# Patient Record
Sex: FEMALE | URBAN - METROPOLITAN AREA
[De-identification: names, ages, dates, MRNs, and addresses within clinical notes are randomized per-mention and may not be internally consistent; named-entity substitution may affect disease eponyms.]

---

## 2017-10-16 ENCOUNTER — EMERGENCY (EMERGENCY)
Facility: HOSPITAL | Age: 22
LOS: 1 days | End: 2017-10-16
Payer: COMMERCIAL

## 2017-10-16 PROCEDURE — 99284 EMERGENCY DEPT VISIT MOD MDM: CPT

## 2017-10-16 PROCEDURE — 73590 X-RAY EXAM OF LOWER LEG: CPT | Mod: 26,LT

## 2017-10-16 PROCEDURE — 73030 X-RAY EXAM OF SHOULDER: CPT | Mod: 26,RT

## 2023-09-07 ENCOUNTER — APPOINTMENT (OUTPATIENT)
Dept: CARDIOLOGY | Facility: CLINIC | Age: 28
End: 2023-09-07
Payer: COMMERCIAL

## 2023-09-07 ENCOUNTER — NON-APPOINTMENT (OUTPATIENT)
Age: 28
End: 2023-09-07

## 2023-09-07 VITALS
WEIGHT: 158 LBS | SYSTOLIC BLOOD PRESSURE: 130 MMHG | HEIGHT: 64 IN | HEART RATE: 77 BPM | OXYGEN SATURATION: 95 % | DIASTOLIC BLOOD PRESSURE: 80 MMHG | BODY MASS INDEX: 26.98 KG/M2

## 2023-09-07 DIAGNOSIS — Z78.9 OTHER SPECIFIED HEALTH STATUS: ICD-10-CM

## 2023-09-07 DIAGNOSIS — R94.31 ABNORMAL ELECTROCARDIOGRAM [ECG] [EKG]: ICD-10-CM

## 2023-09-07 DIAGNOSIS — R00.2 PALPITATIONS: ICD-10-CM

## 2023-09-07 DIAGNOSIS — Z82.49 FAMILY HISTORY OF ISCHEMIC HEART DISEASE AND OTHER DISEASES OF THE CIRCULATORY SYSTEM: ICD-10-CM

## 2023-09-07 DIAGNOSIS — R00.0 TACHYCARDIA, UNSPECIFIED: ICD-10-CM

## 2023-09-07 DIAGNOSIS — Z00.00 ENCOUNTER FOR GENERAL ADULT MEDICAL EXAMINATION W/OUT ABNORMAL FINDINGS: ICD-10-CM

## 2023-09-07 PROCEDURE — 99204 OFFICE O/P NEW MOD 45 MIN: CPT | Mod: 25

## 2023-09-07 PROCEDURE — 93000 ELECTROCARDIOGRAM COMPLETE: CPT

## 2023-09-07 PROCEDURE — 93306 TTE W/DOPPLER COMPLETE: CPT

## 2023-09-07 PROCEDURE — 76376 3D RENDER W/INTRP POSTPROCES: CPT

## 2023-09-07 RX ORDER — NORGESTIMATE AND ETHINYL ESTRADIOL 0.25-0.035
0.25-35 KIT ORAL
Refills: 0 | Status: ACTIVE | COMMUNITY

## 2023-09-07 NOTE — HISTORY OF PRESENT ILLNESS
[FreeTextEntry1] : She has no chest pain She has no shortness of breath She has  palpitations She has no syncope She is neurologically intact She has no edema She has no skin rashes

## 2023-09-07 NOTE — DISCUSSION/SUMMARY
[EKG obtained to assist in diagnosis and management of assessed problem(s)] : EKG obtained to assist in diagnosis and management of assessed problem(s) [FreeTextEntry1] : She will get an echocardiogram, and a 3-day Zio patch.  Once the information has been received we can evaluate the need for an EP consult for tachy arrhythmias.

## 2023-09-07 NOTE — REASON FOR VISIT
[Arrhythmia/ECG Abnorrmalities] : arrhythmia/ECG abnormalities [FreeTextEntry1] : I saw this 27-year-old woman in cardiac consultation on 09/07/23.  The patient is the daughter of a patient of mine who has premature coronary artery disease. She has no past medical history denies cigarettes alcohol and drugs has never been in the hospital but for the last 1 to 3 months has experienced a racing heart where she has to sit down, but does not feel lightheaded. She is very sedentary does not play any sports and does not exercise or workout at the gym. An echo performed today showed mitral valve prolapse with moderate mitral regurgitation.